# Patient Record
Sex: FEMALE | Race: BLACK OR AFRICAN AMERICAN | NOT HISPANIC OR LATINO | URBAN - NONMETROPOLITAN AREA
[De-identification: names, ages, dates, MRNs, and addresses within clinical notes are randomized per-mention and may not be internally consistent; named-entity substitution may affect disease eponyms.]

---

## 2022-03-24 ENCOUNTER — APPOINTMENT (OUTPATIENT)
Dept: URBAN - NONMETROPOLITAN AREA CLINIC 45 | Age: 57
Setting detail: DERMATOLOGY
End: 2022-03-30

## 2022-03-24 DIAGNOSIS — L259 CONTACT DERMATITIS AND OTHER ECZEMA, UNSPECIFIED CAUSE: ICD-10-CM

## 2022-03-24 DIAGNOSIS — L82.1 OTHER SEBORRHEIC KERATOSIS: ICD-10-CM

## 2022-03-24 PROBLEM — L30.9 DERMATITIS, UNSPECIFIED: Status: ACTIVE | Noted: 2022-03-24

## 2022-03-24 PROCEDURE — 87220 TISSUE EXAM FOR FUNGI: CPT

## 2022-03-24 PROCEDURE — OTHER TREATMENT REGIMEN: OTHER

## 2022-03-24 PROCEDURE — 99203 OFFICE O/P NEW LOW 30 MIN: CPT

## 2022-03-24 PROCEDURE — OTHER KOH PREP: OTHER

## 2022-03-24 PROCEDURE — OTHER COUNSELING: OTHER

## 2022-03-24 PROCEDURE — OTHER PRESCRIPTION: OTHER

## 2022-03-24 PROCEDURE — OTHER MIPS QUALITY: OTHER

## 2022-03-24 RX ORDER — CICLOPIROX 7.7 MG/G
APPLY GEL TOPICAL BID
Qty: 45 | Refills: 2 | Status: ERX | COMMUNITY
Start: 2022-03-24

## 2022-03-24 RX ORDER — MUPIROCIN 20 MG/G
APPLY OINTMENT TOPICAL TWICE A DAY
Qty: 22 | Refills: 2 | Status: ERX | COMMUNITY
Start: 2022-03-24

## 2022-03-24 ASSESSMENT — LOCATION SIMPLE DESCRIPTION DERM
LOCATION SIMPLE: LEFT FOOT
LOCATION SIMPLE: RIGHT FOOT
LOCATION SIMPLE: UPPER BACK

## 2022-03-24 ASSESSMENT — LOCATION DETAILED DESCRIPTION DERM
LOCATION DETAILED: LEFT MEDIAL DORSAL FOOT
LOCATION DETAILED: INFERIOR THORACIC SPINE
LOCATION DETAILED: RIGHT MEDIAL DORSAL FOOT

## 2022-03-24 ASSESSMENT — LOCATION ZONE DERM
LOCATION ZONE: FEET
LOCATION ZONE: TRUNK

## 2022-03-24 NOTE — PROCEDURE: TREATMENT REGIMEN
Detail Level: Zone
Plan: Consider overlap of eczematous dermatitis and secondary infection. With significant onychomycosis and positive KOH, suspect bullous tinea pedis, but will treat for possible secondary bacterial infection.\\nAvoid fragrances and dyes \\nKOH performed \\nMupirocin Ointment twice a day to opened areas \\nRecommend All brand Free & Clear Laundry Detergent\\nRecommend Dove fragrance free bar soap vs Hibiclens for cleaning the feet \\nBacterial culture performed \\nCiclopirox Gel twice a day to feet / and in between toes\\n\\nCall or RTC if condition worsens or does not improve.

## 2022-03-24 NOTE — PROCEDURE: KOH PREP
Body Location Override (Optional - Billing Will Still Be Based On Selected Body Map Location If Applicable): feet

## 2022-03-24 NOTE — PROCEDURE: MIPS QUALITY
Quality 110: Preventive Care And Screening: Influenza Immunization: Influenza Immunization Ordered or Recommended, but not Administered due to system reason
Detail Level: Detailed
Additional Notes: Patient advised to see local pharmacy for flu vaccination.
Quality 431: Preventive Care And Screening: Unhealthy Alcohol Use - Screening: Patient not identified as an unhealthy alcohol user when screened for unhealthy alcohol use using a systematic screening method

## 2022-04-22 ENCOUNTER — APPOINTMENT (OUTPATIENT)
Dept: URBAN - NONMETROPOLITAN AREA CLINIC 45 | Age: 57
Setting detail: DERMATOLOGY
End: 2022-04-22

## 2022-04-22 DIAGNOSIS — L259 CONTACT DERMATITIS AND OTHER ECZEMA, UNSPECIFIED CAUSE: ICD-10-CM

## 2022-04-22 PROBLEM — L30.9 DERMATITIS, UNSPECIFIED: Status: ACTIVE | Noted: 2022-04-22

## 2022-04-22 PROCEDURE — OTHER TREATMENT REGIMEN: OTHER

## 2022-04-22 PROCEDURE — OTHER MIPS QUALITY: OTHER

## 2022-04-22 PROCEDURE — 99214 OFFICE O/P EST MOD 30 MIN: CPT

## 2022-04-22 PROCEDURE — OTHER PRESCRIPTION: OTHER

## 2022-04-22 RX ORDER — CLOBETASOL PROPIONATE 0.5 MG/G
APPLY CREAM TOPICAL TWICE A DAY
Qty: 60 | Refills: 2 | Status: ERX | COMMUNITY
Start: 2022-04-22

## 2022-04-22 ASSESSMENT — LOCATION ZONE DERM: LOCATION ZONE: FEET

## 2022-04-22 ASSESSMENT — LOCATION DETAILED DESCRIPTION DERM
LOCATION DETAILED: RIGHT MEDIAL DORSAL FOOT
LOCATION DETAILED: LEFT MEDIAL DORSAL FOOT

## 2022-04-22 ASSESSMENT — LOCATION SIMPLE DESCRIPTION DERM
LOCATION SIMPLE: RIGHT FOOT
LOCATION SIMPLE: LEFT FOOT

## 2022-04-22 NOTE — PROCEDURE: TREATMENT REGIMEN
Continue Regimen: Avoid fragrances and dyes \\nContinue Mupirocin Ointment twice a day to opened areas \\nRecommend All brand Free & Clear Laundry Detergent\\nRecommend Dove fragrance free bar soap vs Hibiclens for cleaning the feet
Detail Level: Zone
Plan: Hold Ciclopirox Gel twice a day to feet / and in between toes\\nStart Clobetasol 0.05 % topical cream twice a day to affected areas (feet) x 2 week cycles\\nCall or RTC if condition worsens or does not improve. Consider doing biopsy if any new wounds appear

## 2022-05-20 ENCOUNTER — APPOINTMENT (OUTPATIENT)
Dept: URBAN - NONMETROPOLITAN AREA CLINIC 45 | Age: 57
Setting detail: DERMATOLOGY
End: 2022-05-20

## 2022-05-20 DIAGNOSIS — L259 CONTACT DERMATITIS AND OTHER ECZEMA, UNSPECIFIED CAUSE: ICD-10-CM

## 2022-05-20 PROBLEM — L30.9 DERMATITIS, UNSPECIFIED: Status: ACTIVE | Noted: 2022-05-20

## 2022-05-20 PROCEDURE — OTHER TREATMENT REGIMEN: OTHER

## 2022-05-20 PROCEDURE — 99212 OFFICE O/P EST SF 10 MIN: CPT

## 2022-05-20 ASSESSMENT — LOCATION DETAILED DESCRIPTION DERM
LOCATION DETAILED: LEFT MEDIAL DORSAL FOOT
LOCATION DETAILED: RIGHT MEDIAL DORSAL FOOT

## 2022-05-20 ASSESSMENT — LOCATION SIMPLE DESCRIPTION DERM
LOCATION SIMPLE: RIGHT FOOT
LOCATION SIMPLE: LEFT FOOT

## 2022-05-20 ASSESSMENT — LOCATION ZONE DERM: LOCATION ZONE: FEET

## 2022-05-20 NOTE — PROCEDURE: TREATMENT REGIMEN
Detail Level: Zone
Plan: Patient has changed shoes and has not had any recurrences
Continue Regimen: Avoid fragrances and dyes \\nContinue Mupirocin Ointment twice a day to opened areas PRN\\nRecommend All brand Free & Clear Laundry Detergent\\nRecommend Dove fragrance free bar soap vs Hibiclens for cleaning the feet\\nClobetasol 0.05 % topical cream twice a day to affected areas (feet) x 2 week cycles\\n